# Patient Record
Sex: FEMALE | Race: WHITE | HISPANIC OR LATINO | Employment: FULL TIME | URBAN - METROPOLITAN AREA
[De-identification: names, ages, dates, MRNs, and addresses within clinical notes are randomized per-mention and may not be internally consistent; named-entity substitution may affect disease eponyms.]

---

## 2022-04-25 ENCOUNTER — OFFICE VISIT (OUTPATIENT)
Dept: OBSTETRICS AND GYNECOLOGY | Age: 37
End: 2022-04-25

## 2022-04-25 VITALS
HEIGHT: 60 IN | DIASTOLIC BLOOD PRESSURE: 76 MMHG | WEIGHT: 158.8 LBS | BODY MASS INDEX: 31.18 KG/M2 | SYSTOLIC BLOOD PRESSURE: 110 MMHG

## 2022-04-25 DIAGNOSIS — B37.31 YEAST VAGINITIS: ICD-10-CM

## 2022-04-25 DIAGNOSIS — Z01.419 WELL WOMAN EXAM WITH ROUTINE GYNECOLOGICAL EXAM: Primary | ICD-10-CM

## 2022-04-25 PROCEDURE — 99213 OFFICE O/P EST LOW 20 MIN: CPT | Performed by: NURSE PRACTITIONER

## 2022-04-25 PROCEDURE — 3008F BODY MASS INDEX DOCD: CPT | Performed by: NURSE PRACTITIONER

## 2022-04-25 PROCEDURE — 99385 PREV VISIT NEW AGE 18-39: CPT | Performed by: NURSE PRACTITIONER

## 2022-04-25 RX ORDER — FLUCONAZOLE 150 MG/1
150 TABLET ORAL ONCE
Qty: 1 TABLET | Refills: 0 | Status: SHIPPED | OUTPATIENT
Start: 2022-04-25 | End: 2022-04-25

## 2022-04-25 NOTE — PROGRESS NOTES
[facility]  Routine Annual Visit    2022    Patient: Dayna Garces          MR#:[id number]      History of Present Illness    36 y.o. female  who presents for annual exam as a new patient with several complaints today.    She reports last GYN care/pap smear 7 years ago.    She states periods are normal and monthly- no problems. She reports for the past month she has been experiencing intermittent mild lower abdominal pain- most noticeable when urinating. She had a negative urine culture earlier this month and reports completed a round of macrobid with no improvement in symptoms. No worsening or improvement in symptoms since first noticed. She also reports mild vulvar discomfort at the introitus with itching. No lesions, sores, or abnormal discharge. She reports she is not currently sexually active. CT/NG negative at urgent care.     US is completed today and shows 2 cm x 2cm pocket of endometrial fluid and a 2 cm x 2 cm complex right ovarian cyst. These results were discussed with patient and she was advised of need for follow up.    An interpretor was used throughout this visit.      Patient's last menstrual period was 2022 (approximate).  Obstetric History:  OB History        3    Para   3    Term   3            AB        Living   3       SAB        IAB        Ectopic        Molar        Multiple        Live Births   3               Menstrual History:     Patient's last menstrual period was 2022 (approximate).       Sexual History:       ________________________________________  There is no problem list on file for this patient.      History reviewed. No pertinent past medical history.    Past Surgical History:   Procedure Laterality Date   • APPENDECTOMY         Social History     Tobacco Use   Smoking Status Never Smoker   Smokeless Tobacco Never Used       Family History   Problem Relation Age of Onset   • No Known Problems Mother    • No Known Problems  "Father        Prior to Admission medications    Not on File     The following portions of the patient's history were reviewed and updated as appropriate: allergies, current medications, past family history, past medical history, past social history, past surgical history and problem list.    Review of Systems   Constitutional: Negative.    HENT: Negative.    Eyes: Negative for visual disturbance.   Respiratory: Negative for cough, shortness of breath and wheezing.    Cardiovascular: Negative for chest pain, palpitations and leg swelling.   Gastrointestinal: Negative for abdominal distention, abdominal pain, blood in stool, constipation, diarrhea, nausea and vomiting.   Endocrine: Negative for cold intolerance and heat intolerance.   Genitourinary: Positive for dysuria and pelvic pain. Negative for difficulty urinating, dyspareunia, frequency, genital sores, hematuria, menstrual problem, urgency, vaginal bleeding, vaginal discharge and vaginal pain.   Musculoskeletal: Negative.    Skin: Negative.    Neurological: Negative for dizziness, weakness, light-headedness, numbness and headaches.   Hematological: Negative.    Psychiatric/Behavioral: Negative.    Breasts: negative for lumps skin changes, dimpling, swelling, nipple changes/discharge bilaterally     Objective   Physical Exam    /76   Ht 152.4 cm (60\")   Wt 72 kg (158 lb 12.8 oz)   LMP 04/18/2022 (Approximate)   Breastfeeding No   BMI 31.01 kg/m²    BP Readings from Last 3 Encounters:   04/25/22 110/76   04/05/22 119/76   03/22/22 118/78      Wt Readings from Last 3 Encounters:   04/25/22 72 kg (158 lb 12.8 oz)   04/05/22 70.3 kg (155 lb)   03/22/22 70.3 kg (155 lb)        BMI: Estimated body mass index is 31.01 kg/m² as calculated from the following:    Height as of this encounter: 152.4 cm (60\").    Weight as of this encounter: 72 kg (158 lb 12.8 oz).            General:   alert, appears stated age and cooperative   Heart: regular rate and rhythm, " S1, S2 normal, no murmur, click, rub or gallop   Lungs: clear to auscultation bilaterally   Abdomen: soft, non-tender, without masses or organomegaly   Breast: inspection negative, no nipple discharge or bleeding, no masses or nodularity palpable   Vulva: External genitalia including bartholin's glands, Urethra, [place]'s gland and urethra meatus are normal, Perineum, rectum and anus appear normal  and Bladder appears normal without significant prolapse    Vagina: normal mucosa, normal discharge   Cervix: no cervical motion tenderness and no lesions   Uterus: normal size, mobile, non-tender and normal shape and consistency   Adnexa: no mass, fullness, tenderness     Assessment:    Diagnoses and all orders for this visit:    1. Well woman exam with routine gynecological exam (Primary)  -     IgP, Aptima HPV    2. Yeast vaginitis  -     Urine Culture - Urine, Urine, Clean Catch    Other orders  -     fluconazole (Diflucan) 150 MG tablet; Take 1 tablet by mouth 1 (One) Time for 1 dose.  Dispense: 1 tablet; Refill: 0      Recommend treat with diflucan for possible yeast. Will send repeat urine culture. Advise follow up 4 weeks for repeat US and consult. Advised to call if symptoms worsen before then. She verbalizes understanding and agrees with plan.    Healthy lifestyle modifications discussed, counseled on self breast exams and bone health        Jeanne Shoemaker, DESTINY  4/25/2022 11:15 EDT

## 2022-04-27 LAB
BACTERIA UR CULT: NO GROWTH
BACTERIA UR CULT: NORMAL

## 2022-04-29 LAB
CYTOLOGIST CVX/VAG CYTO: NORMAL
CYTOLOGY CVX/VAG DOC CYTO: NORMAL
CYTOLOGY CVX/VAG DOC THIN PREP: NORMAL
DX ICD CODE: NORMAL
HIV 1 & 2 AB SER-IMP: NORMAL
HPV I/H RISK 4 DNA CVX QL PROBE+SIG AMP: NEGATIVE
OTHER STN SPEC: NORMAL
STAT OF ADQ CVX/VAG CYTO-IMP: NORMAL

## 2022-05-27 ENCOUNTER — OFFICE VISIT (OUTPATIENT)
Dept: OBSTETRICS AND GYNECOLOGY | Age: 37
End: 2022-05-27

## 2022-05-27 VITALS
WEIGHT: 156.6 LBS | SYSTOLIC BLOOD PRESSURE: 114 MMHG | HEIGHT: 60 IN | BODY MASS INDEX: 30.74 KG/M2 | DIASTOLIC BLOOD PRESSURE: 70 MMHG

## 2022-05-27 DIAGNOSIS — N83.201 CYST OF RIGHT OVARY: ICD-10-CM

## 2022-05-27 DIAGNOSIS — R30.0 DYSURIA: Primary | ICD-10-CM

## 2022-05-27 DIAGNOSIS — N89.8 VAGINAL ITCHING: ICD-10-CM

## 2022-05-27 PROCEDURE — 99213 OFFICE O/P EST LOW 20 MIN: CPT | Performed by: STUDENT IN AN ORGANIZED HEALTH CARE EDUCATION/TRAINING PROGRAM

## 2022-05-27 RX ORDER — PHENAZOPYRIDINE HYDROCHLORIDE 95 MG/1
95 TABLET ORAL 3 TIMES DAILY
Qty: 9 TABLET | Refills: 0 | Status: SHIPPED | OUTPATIENT
Start: 2022-05-27 | End: 2022-05-30

## 2022-05-27 RX ORDER — FLUCONAZOLE 150 MG/1
TABLET ORAL
Qty: 2 TABLET | Refills: 0 | Status: SHIPPED | OUTPATIENT
Start: 2022-05-27

## 2022-05-27 RX ORDER — FLUCONAZOLE 150 MG/1
150 TABLET ORAL ONCE
COMMUNITY
Start: 2022-04-25 | End: 2022-05-27

## 2022-05-27 NOTE — PROGRESS NOTES
[facility]   Obstetrics and Gynecology     2022      Patient:  Dayna Garces   MR#:[id number]    Office note    Chief Complaint   Patient presents with   • Follow-up     Gyn follow up US today c/o lower abdominal pain       Subjective     History of Present Illness  36 y.o. female  present for f/u of right ovarian cyst.  Identified on Us one month ago and appeared complex.  Appears simple today and <2cm.  Reports intermittent midline pelvic cramping since last visit.  Reports regular monthly menses and the cramps happen at times when she is not menstruating.  Also reports burning at urethra after urinating but not during urination x 6 mo.  Feels like these symptoms are totally separate.  Had a yeast infection 6 weeks ago that resolved with one dose of diflucan.  Denies itching currently.      #[id number]      Relevant data reviewed:  US Non-ob Transvaginal (2022 10:45)  US Non-ob Transvaginal (2022 10:56)      There is no problem list on file for this patient.      Past Medical History:   Diagnosis Date   • No pertinent past medical history      Past Surgical History:   Procedure Laterality Date   • APPENDECTOMY     • TUBAL ABDOMINAL LIGATION       Obstetric History:  OB History        3    Para   3    Term   3            AB        Living   3       SAB        IAB        Ectopic        Molar        Multiple        Live Births   3               Menstrual History:     Patient's last menstrual period was 2022 (approximate).       # 1 - Date: , Sex: Female, Weight: 2722 g (6 lb), GA: None, Delivery: Vaginal, Spontaneous, Apgar1: None, Apgar5: None, Living: Living, Birth Comments: None    # 2 - Date: , Sex: Female, Weight: 4082 g (9 lb), GA: None, Delivery: Vaginal, Spontaneous, Apgar1: None, Apgar5: None, Living: Living, Birth Comments: None    # 3 - Date: , Sex: Male, Weight: 4082 g (9 lb), GA: None, Delivery: Vaginal, Spontaneous,  "Apgar1: None, Apgar5: None, Living: Living, Birth Comments: None    Family History   Problem Relation Age of Onset   • No Known Problems Father    • No Known Problems Mother    • Breast cancer Neg Hx    • Uterine cancer Neg Hx    • Colon cancer Neg Hx    • Ovarian cancer Neg Hx      Social History     Tobacco Use   • Smoking status: Never Smoker   • Smokeless tobacco: Never Used   Vaping Use   • Vaping Use: Never used   Substance Use Topics   • Alcohol use: No   • Drug use: Never     Patient has no known allergies.    Current Outpatient Medications:   •  fluconazole (Diflucan) 150 MG tablet, Take one tablet once then a second tablet 3 days later, Disp: 2 tablet, Rfl: 0  •  phenazopyridine (PYRIDIUM) 95 MG tablet, Take 1 tablet by mouth 3 (Three) Times a Day for 3 days., Disp: 9 tablet, Rfl: 0    The following portions of the patient's history were reviewed and updated as appropriate: allergies, current medications, past family history, past medical history, past social history, past surgical history and problem list.    Review of Systems   Genitourinary: Positive for dysuria and vaginal pain.   All other systems reviewed and are negative.      BP Readings from Last 3 Encounters:   05/27/22 114/70   04/25/22 110/76   04/05/22 119/76      Wt Readings from Last 3 Encounters:   05/27/22 71 kg (156 lb 9.6 oz)   04/25/22 72 kg (158 lb 12.8 oz)   04/05/22 70.3 kg (155 lb)      BMI: Estimated body mass index is 30.58 kg/m² as calculated from the following:    Height as of this encounter: 152.4 cm (60\").    Weight as of this encounter: 71 kg (156 lb 9.6 oz). BSA: Estimated body surface area is 1.68 meters squared as calculated from the following:    Height as of this encounter: 152.4 cm (60\").    Weight as of this encounter: 71 kg (156 lb 9.6 oz).    Objective   Physical Exam  Vitals and nursing note reviewed.   Constitutional:       General: She is not in acute distress.     Appearance: Normal appearance.   Pulmonary:      " Effort: Pulmonary effort is normal. No respiratory distress.   Genitourinary:     General: Normal vulva.      Vagina: Vaginal discharge (thick, white discharge) present.      Cervix: Normal.   Neurological:      Mental Status: She is alert.         Assessment & Plan     Diagnoses and all orders for this visit:    1. Dysuria (Primary)    2. Cyst of right ovary    3. Vaginal itching  -     NuSwab Vaginitis (VG) - Swab, Vagina    Other orders  -     phenazopyridine (PYRIDIUM) 95 MG tablet; Take 1 tablet by mouth 3 (Three) Times a Day for 3 days.  Dispense: 9 tablet; Refill: 0  -     fluconazole (Diflucan) 150 MG tablet; Take one tablet once then a second tablet 3 days later  Dispense: 2 tablet; Refill: 0    -Reviewed ultrasound imaging.  Right ovarian cyst is under 2 cm and appears simple on today's ultrasound.  It appeared complex with internal projections on her last ultrasound so I would like to repeat imaging in 6 months to confirm normalcy.  - Exam consistent with yeast infection so will empirically treat with Diflucan every 3 days x2 doses.  I suspect this may be contributing to the burning after urination and irritation with intercourse as well.  I also recommended patient try Pyridium for 3 days.  If this improves her symptoms, I encouraged her to try cranberry juice if the symptoms recur.    Return in about 6 months (around 11/27/2022) for Next f/u with gyn Us of right ovarian cyst.    I spent 20 minutes caring for Marlee on this date of service. This time includes time spent by me in the following activities: preparing for the visit, reviewing tests, obtaining and/or reviewing a separately obtained history, performing a medically appropriate examination and/or evaluation, counseling and educating the patient/family/caregiver, ordering medications, tests, or procedures and documenting information in the medical record.    Shelli Reyes MD   5/27/2022 12:40 EDT

## 2022-05-30 LAB
A VAGINAE DNA VAG QL NAA+PROBE: NORMAL SCORE
BVAB2 DNA VAG QL NAA+PROBE: NORMAL SCORE
C ALBICANS DNA VAG QL NAA+PROBE: NEGATIVE
C GLABRATA DNA VAG QL NAA+PROBE: NEGATIVE
MEGA1 DNA VAG QL NAA+PROBE: NORMAL SCORE
T VAGINALIS DNA VAG QL NAA+PROBE: NEGATIVE

## 2022-07-14 ENCOUNTER — TELEPHONE (OUTPATIENT)
Dept: OBSTETRICS AND GYNECOLOGY | Age: 37
End: 2022-07-14

## 2022-07-14 NOTE — TELEPHONE ENCOUNTER
Patient is wanting to switch her care to .  She states the reason she is wanting to switch is due to the language barrier and she knows  speaks Grenadian.  Please advise.

## 2022-09-07 ENCOUNTER — OFFICE VISIT (OUTPATIENT)
Dept: OBSTETRICS AND GYNECOLOGY | Age: 37
End: 2022-09-07

## 2022-09-07 ENCOUNTER — TELEPHONE (OUTPATIENT)
Dept: OBSTETRICS AND GYNECOLOGY | Age: 37
End: 2022-09-07

## 2022-09-07 VITALS
SYSTOLIC BLOOD PRESSURE: 118 MMHG | BODY MASS INDEX: 30.43 KG/M2 | HEIGHT: 60 IN | WEIGHT: 155 LBS | DIASTOLIC BLOOD PRESSURE: 70 MMHG

## 2022-09-07 DIAGNOSIS — N30.11 INTERSTITIAL CYSTITIS (CHRONIC) WITH HEMATURIA: ICD-10-CM

## 2022-09-07 DIAGNOSIS — N83.201 CYST OF RIGHT OVARY: ICD-10-CM

## 2022-09-07 DIAGNOSIS — Z13.89 SCREENING FOR BLOOD OR PROTEIN IN URINE: ICD-10-CM

## 2022-09-07 DIAGNOSIS — N94.10 DYSPAREUNIA IN FEMALE: Primary | ICD-10-CM

## 2022-09-07 DIAGNOSIS — N89.8 VAGINAL DISCHARGE: ICD-10-CM

## 2022-09-07 LAB
BILIRUB BLD-MCNC: NEGATIVE MG/DL
CLARITY, POC: CLEAR
COLOR UR: YELLOW
GLUCOSE UR STRIP-MCNC: NEGATIVE MG/DL
KETONES UR QL: NEGATIVE
LEUKOCYTE EST, POC: ABNORMAL
NITRITE UR-MCNC: NEGATIVE MG/ML
PH UR: 5.5 [PH] (ref 5–8)
PROT UR STRIP-MCNC: NEGATIVE MG/DL
RBC # UR STRIP: ABNORMAL /UL
SP GR UR: 1 (ref 1–1.03)
UROBILINOGEN UR QL: NORMAL

## 2022-09-07 PROCEDURE — 99213 OFFICE O/P EST LOW 20 MIN: CPT | Performed by: OBSTETRICS & GYNECOLOGY

## 2022-09-07 NOTE — PROGRESS NOTES
[facility]   Obstetrics and Gynecology     2022      Patient:  Dayna Garces   MR#:[id number]    Office note    Chief Complaint   Patient presents with   • Follow-up     gyn f/u ovarian cyst- former [facility] pt (switched to due language barrier)        Subjective     History of Present Illness  36 y.o. female  presents for follow-up on her previous complex ovarian cyst that appears resolved on repeat ultrasound today.  The patient states enlarged most of her symptoms have resolved as well.  The patient reported a vaginal discharge last visit to that has not resolved after treatment.    The patient reports she continues to have some mild discomfort on the outside with intercourse.    And exploring the issue more in depth with the patient she is actually having extreme discomfort after voiding feeling like she still needs to void for several minutes after her bladder is emptied.        Relevant data reviewed:  NuSwab Vaginitis (VG) - Swab, Vagina (2022 15:37)  US Non-ob Transvaginal (2022 13:53)      There is no problem list on file for this patient.      Past Medical History:   Diagnosis Date   • No pertinent past medical history      Past Surgical History:   Procedure Laterality Date   • APPENDECTOMY     • TUBAL ABDOMINAL LIGATION       Obstetric History:  OB History        3    Para   3    Term   3            AB        Living   3       SAB        IAB        Ectopic        Molar        Multiple        Live Births   3               Menstrual History:     Patient's last menstrual period was 2022 (exact date).       # 1 - Date: , Sex: Female, Weight: 2722 g (6 lb), GA: None, Delivery: Vaginal, Spontaneous, Apgar1: None, Apgar5: None, Living: Living, Birth Comments: None    # 2 - Date: , Sex: Female, Weight: 4082 g (9 lb), GA: None, Delivery: Vaginal, Spontaneous, Apgar1: None, Apgar5: None, Living: Living, Birth Comments: None    # 3 - Date: , Sex: Male,  "Weight: 4082 g (9 lb), GA: None, Delivery: Vaginal, Spontaneous, Apgar1: None, Apgar5: None, Living: Living, Birth Comments: None    Family History   Problem Relation Age of Onset   • No Known Problems Father    • No Known Problems Mother    • Breast cancer Neg Hx    • Uterine cancer Neg Hx    • Colon cancer Neg Hx    • Ovarian cancer Neg Hx      Social History     Tobacco Use   • Smoking status: Never Smoker   • Smokeless tobacco: Never Used   Vaping Use   • Vaping Use: Never used   Substance Use Topics   • Alcohol use: No   • Drug use: Never     Patient has no known allergies.    Current Outpatient Medications:   •  fluconazole (Diflucan) 150 MG tablet, Take one tablet once then a second tablet 3 days later, Disp: 2 tablet, Rfl: 0    The following portions of the patient's history were reviewed and updated as appropriate: allergies, current medications, past family history, past medical history, past social history, past surgical history and problem list.    Review of Systems    BP Readings from Last 3 Encounters:   09/07/22 118/70   05/27/22 114/70   04/25/22 110/76      Wt Readings from Last 3 Encounters:   09/07/22 70.3 kg (155 lb)   05/27/22 71 kg (156 lb 9.6 oz)   04/25/22 72 kg (158 lb 12.8 oz)      BMI: Estimated body mass index is 30.27 kg/m² as calculated from the following:    Height as of this encounter: 152.4 cm (60\").    Weight as of this encounter: 70.3 kg (155 lb). BSA: Estimated body surface area is 1.67 meters squared as calculated from the following:    Height as of this encounter: 152.4 cm (60\").    Weight as of this encounter: 70.3 kg (155 lb).    Objective   Physical Exam  Vitals and nursing note reviewed.   Constitutional:       General: She is not in acute distress.     Appearance: Normal appearance. She is well-developed. She is not ill-appearing.   HENT:      Head: Normocephalic.   Pulmonary:      Effort: Pulmonary effort is normal.   Abdominal:      General: Abdomen is flat. There is no " distension.      Palpations: Abdomen is soft. There is no mass.      Tenderness: There is no abdominal tenderness.   Genitourinary:     Vagina: Normal.      Comments: Normal external anatomy with no lesions    Normal vaginal secretions with slight blood-tinged from residual menses  Musculoskeletal:         General: No swelling or tenderness.      Comments: No calf tenderness or swelling    Neurological:      Mental Status: She is alert and oriented to person, place, and time.   Psychiatric:         Behavior: Behavior normal.         Thought Content: Thought content normal.         Judgment: Judgment normal.         Assessment & Plan     Diagnoses and all orders for this visit:    1. Dyspareunia in female (Primary)    2. Cyst of right ovary    3. Vaginal discharge      Plan:  Urine sent for culture.  Information on interstitial cystitis provided in Greek.  Inquiry made for Elmiron treatment for cystitis but the medication is more than $900 a month.    Patient may need to see urology for cystoscopy    No follow-ups on file.    Tai Brown MD   9/7/2022 14:25 EDT

## 2022-09-07 NOTE — TELEPHONE ENCOUNTER
I called [facility] for a price of Elmiron 100 tid for a month and the cost is $900.00 per month.

## 2022-09-09 LAB
BACTERIA UR CULT: NO GROWTH
BACTERIA UR CULT: NORMAL

## 2022-09-11 PROBLEM — A59.01 TRICHOMONAL VAGINITIS: Status: ACTIVE | Noted: 2022-09-11

## 2022-09-11 LAB
A VAGINAE DNA VAG QL NAA+PROBE: ABNORMAL SCORE
BVAB2 DNA VAG QL NAA+PROBE: ABNORMAL SCORE
C ALBICANS DNA VAG QL NAA+PROBE: NEGATIVE
C GLABRATA DNA VAG QL NAA+PROBE: NEGATIVE
C TRACH DNA VAG QL NAA+PROBE: NEGATIVE
MEGA1 DNA VAG QL NAA+PROBE: ABNORMAL SCORE
N GONORRHOEA DNA VAG QL NAA+PROBE: NEGATIVE
T VAGINALIS DNA VAG QL NAA+PROBE: POSITIVE

## 2022-09-11 NOTE — PROGRESS NOTES
Call patient: STD screening returns positive for Trichomonas    Scheduled consult for treatment and instructions

## 2022-09-14 ENCOUNTER — OFFICE VISIT (OUTPATIENT)
Dept: OBSTETRICS AND GYNECOLOGY | Age: 37
End: 2022-09-14

## 2022-09-14 VITALS
HEIGHT: 60 IN | SYSTOLIC BLOOD PRESSURE: 118 MMHG | DIASTOLIC BLOOD PRESSURE: 70 MMHG | BODY MASS INDEX: 30.23 KG/M2 | WEIGHT: 154 LBS

## 2022-09-14 DIAGNOSIS — Z13.89 SCREENING FOR BLOOD OR PROTEIN IN URINE: ICD-10-CM

## 2022-09-14 DIAGNOSIS — A59.01 TRICHOMONAL VAGINITIS: Primary | ICD-10-CM

## 2022-09-14 LAB
BILIRUB BLD-MCNC: NEGATIVE MG/DL
GLUCOSE UR STRIP-MCNC: NEGATIVE MG/DL
KETONES UR QL: NEGATIVE
LEUKOCYTE EST, POC: NEGATIVE
NITRITE UR-MCNC: NEGATIVE MG/ML
PH UR: 7 [PH] (ref 5–8)
PROT UR STRIP-MCNC: NEGATIVE MG/DL
RBC # UR STRIP: NEGATIVE /UL
SP GR UR: 1.01 (ref 1–1.03)
UROBILINOGEN UR QL: NORMAL

## 2022-09-14 PROCEDURE — 81002 URINALYSIS NONAUTO W/O SCOPE: CPT | Performed by: OBSTETRICS & GYNECOLOGY

## 2022-09-14 PROCEDURE — 99213 OFFICE O/P EST LOW 20 MIN: CPT | Performed by: OBSTETRICS & GYNECOLOGY

## 2022-09-14 NOTE — PROGRESS NOTES
[facility]   Obstetrics and Gynecology     2022      Patient:  Dayna Garces   MR#:[id number]    Office note    Chief Complaint   Patient presents with   • Consult     +trich consult        Subjective     History of Present Illness  36 y.o. female  presents for follow-up discussion on positive trichomonas screen last visit.  The patient's been reporting symptoms of bladder irritation and vaginitis for months and months.        Relevant data reviewed:      Patient Active Problem List   Diagnosis   • Trichomonal vaginitis       Past Medical History:   Diagnosis Date   • No pertinent past medical history      Past Surgical History:   Procedure Laterality Date   • APPENDECTOMY     • TUBAL ABDOMINAL LIGATION       Obstetric History:  OB History        3    Para   3    Term   3            AB        Living   3       SAB        IAB        Ectopic        Molar        Multiple        Live Births   3               Menstrual History:     Patient's last menstrual period was 2022 (exact date).       # 1 - Date: , Sex: Female, Weight: 2722 g (6 lb), GA: None, Delivery: Vaginal, Spontaneous, Apgar1: None, Apgar5: None, Living: Living, Birth Comments: None    # 2 - Date: , Sex: Female, Weight: 4082 g (9 lb), GA: None, Delivery: Vaginal, Spontaneous, Apgar1: None, Apgar5: None, Living: Living, Birth Comments: None    # 3 - Date: , Sex: Male, Weight: 4082 g (9 lb), GA: None, Delivery: Vaginal, Spontaneous, Apgar1: None, Apgar5: None, Living: Living, Birth Comments: None    Family History   Problem Relation Age of Onset   • No Known Problems Father    • No Known Problems Mother    • Breast cancer Neg Hx    • Uterine cancer Neg Hx    • Colon cancer Neg Hx    • Ovarian cancer Neg Hx      Social History     Tobacco Use   • Smoking status: Never Smoker   • Smokeless tobacco: Never Used   Vaping Use   • Vaping Use: Never used   Substance Use Topics   • Alcohol use: No   • Drug use:  "Never     Patient has no known allergies.    Current Outpatient Medications:   •  fluconazole (Diflucan) 150 MG tablet, Take one tablet once then a second tablet 3 days later, Disp: 2 tablet, Rfl: 0    The following portions of the patient's history were reviewed and updated as appropriate: allergies, current medications, past family history, past medical history, past social history, past surgical history and problem list.    Review of Systems   Genitourinary: Positive for vaginal discharge.       BP Readings from Last 3 Encounters:   09/14/22 118/70   09/07/22 118/70   05/27/22 114/70      Wt Readings from Last 3 Encounters:   09/14/22 69.9 kg (154 lb)   09/07/22 70.3 kg (155 lb)   05/27/22 71 kg (156 lb 9.6 oz)      BMI: Estimated body mass index is 30.08 kg/m² as calculated from the following:    Height as of this encounter: 152.4 cm (60\").    Weight as of this encounter: 69.9 kg (154 lb). BSA: Estimated body surface area is 1.67 meters squared as calculated from the following:    Height as of this encounter: 152.4 cm (60\").    Weight as of this encounter: 69.9 kg (154 lb).    Objective   Physical Exam  Vitals and nursing note reviewed.   Constitutional:       Appearance: She is well-developed.   HENT:      Head: Normocephalic and atraumatic.   Cardiovascular:      Rate and Rhythm: Normal rate.   Pulmonary:      Effort: Pulmonary effort is normal.   Abdominal:      General: Bowel sounds are normal. There is no distension.      Palpations: Abdomen is soft.      Tenderness: There is no abdominal tenderness.   Skin:     General: Skin is warm and dry.   Neurological:      Mental Status: She is alert and oriented to person, place, and time.   Psychiatric:         Behavior: Behavior normal.         Thought Content: Thought content normal.         Judgment: Judgment normal.         Assessment & Plan     Diagnoses and all orders for this visit:    1. Trichomonal vaginitis (Primary)    2. Screening for blood or protein " "in urine  -     POC Urinalysis Dipstick      Secnidazole 2g packets x 4 provided.  Patient is instructed to take the medicine with her  this evening and then repeat the dose in 1 week.    Patient instructed not to consume any alcohol products before or after the medication.    Suggest the patient return for test of cure in 6 weeks.    Discussed the issue of the positive STD in a \"monogamous couple\" and the need to address that point to avoid reinfection     No follow-ups on file.    Tai Brown MD   9/14/2022 15:35 EDT  "

## 2022-11-02 ENCOUNTER — OFFICE VISIT (OUTPATIENT)
Dept: OBSTETRICS AND GYNECOLOGY | Age: 37
End: 2022-11-02

## 2022-11-02 VITALS
HEIGHT: 60 IN | DIASTOLIC BLOOD PRESSURE: 70 MMHG | SYSTOLIC BLOOD PRESSURE: 118 MMHG | WEIGHT: 156 LBS | BODY MASS INDEX: 30.63 KG/M2

## 2022-11-02 DIAGNOSIS — R39.15 URINARY URGENCY: ICD-10-CM

## 2022-11-02 DIAGNOSIS — Z13.89 SCREENING FOR BLOOD OR PROTEIN IN URINE: ICD-10-CM

## 2022-11-02 DIAGNOSIS — A59.01 TRICHOMONAL VAGINITIS: Primary | ICD-10-CM

## 2022-11-02 LAB
BILIRUB BLD-MCNC: NEGATIVE MG/DL
GLUCOSE UR STRIP-MCNC: NEGATIVE MG/DL
KETONES UR QL: NEGATIVE
LEUKOCYTE EST, POC: NEGATIVE
NITRITE UR-MCNC: NEGATIVE MG/ML
PH UR: 5 [PH] (ref 5–8)
PROT UR STRIP-MCNC: NEGATIVE MG/DL
RBC # UR STRIP: NEGATIVE /UL
SP GR UR: 1.02 (ref 1–1.03)
UROBILINOGEN UR QL: NORMAL

## 2022-11-02 PROCEDURE — 99213 OFFICE O/P EST LOW 20 MIN: CPT | Performed by: OBSTETRICS & GYNECOLOGY

## 2022-11-02 RX ORDER — SOLIFENACIN SUCCINATE 10 MG/1
10 TABLET, FILM COATED ORAL DAILY
Qty: 30 TABLET | Refills: 9 | Status: SHIPPED | OUTPATIENT
Start: 2022-11-02 | End: 2023-11-02

## 2022-11-02 NOTE — PROGRESS NOTES
[facility]   Obstetrics and Gynecology     2022      Patient:  Dayna Garces   MR#:[id number]    Office note    Chief Complaint   Patient presents with   • Follow-up     VICKIE trich        Subjective     History of Present Illness  36 y.o. female  presents for scheduled test of cure for previous trichomonas infection.  She reports taking her medication.  The patient was seen previously complaining of severe urinary urgency and recurrent urgency after she voids.  Urine screens previously and today are negative.    Previously her symptoms sounded like interstitial cystitis and a trial of medication was sent to her pharmacy but was cost prohibitive as the patient does not have insurance coverage.    We discussed the possibility of trying medicines for detrusor instability but the same situation applies where the medication is cost prohibitory.      Relevant data reviewed:      Patient Active Problem List   Diagnosis   • Trichomonal vaginitis       Past Medical History:   Diagnosis Date   • No pertinent past medical history      Past Surgical History:   Procedure Laterality Date   • APPENDECTOMY     • TUBAL ABDOMINAL LIGATION       Obstetric History:  OB History        3    Para   3    Term   3            AB        Living   3       SAB        IAB        Ectopic        Molar        Multiple        Live Births   3               Menstrual History:     Patient's last menstrual period was 10/15/2022 (within days).       # 1 - Date: , Sex: Female, Weight: 2722 g (6 lb), GA: None, Delivery: Vaginal, Spontaneous, Apgar1: None, Apgar5: None, Living: Living, Birth Comments: None    # 2 - Date: , Sex: Female, Weight: 4082 g (9 lb), GA: None, Delivery: Vaginal, Spontaneous, Apgar1: None, Apgar5: None, Living: Living, Birth Comments: None    # 3 - Date: , Sex: Male, Weight: 4082 g (9 lb), GA: None, Delivery: Vaginal, Spontaneous, Apgar1: None, Apgar5: None, Living: Living, Birth  "Comments: None    Family History   Problem Relation Age of Onset   • No Known Problems Father    • No Known Problems Mother    • Breast cancer Neg Hx    • Uterine cancer Neg Hx    • Colon cancer Neg Hx    • Ovarian cancer Neg Hx      Social History     Tobacco Use   • Smoking status: Never   • Smokeless tobacco: Never   Vaping Use   • Vaping Use: Never used   Substance Use Topics   • Alcohol use: No   • Drug use: Never     Patient has no known allergies.    Current Outpatient Medications:   •  fluconazole (Diflucan) 150 MG tablet, Take one tablet once then a second tablet 3 days later, Disp: 2 tablet, Rfl: 0  •  solifenacin (VESIcare) 10 MG tablet, Take 1 tablet by mouth Daily., Disp: 30 tablet, Rfl: 9    The following portions of the patient's history were reviewed and updated as appropriate: allergies, current medications, past family history, past medical history, past social history, past surgical history and problem list.    Review of Systems    BP Readings from Last 3 Encounters:   11/02/22 118/70   09/14/22 118/70   09/07/22 118/70      Wt Readings from Last 3 Encounters:   11/02/22 70.8 kg (156 lb)   09/14/22 69.9 kg (154 lb)   09/07/22 70.3 kg (155 lb)      BMI: Estimated body mass index is 30.47 kg/m² as calculated from the following:    Height as of this encounter: 152.4 cm (60\").    Weight as of this encounter: 70.8 kg (156 lb). BSA: Estimated body surface area is 1.68 meters squared as calculated from the following:    Height as of this encounter: 152.4 cm (60\").    Weight as of this encounter: 70.8 kg (156 lb).    Objective   Physical Exam  Vitals and nursing note reviewed.   Constitutional:       Appearance: She is well-developed.   HENT:      Head: Normocephalic and atraumatic.   Cardiovascular:      Rate and Rhythm: Normal rate.   Pulmonary:      Effort: Pulmonary effort is normal.   Abdominal:      General: Bowel sounds are normal. There is no distension.      Palpations: Abdomen is soft.      " Tenderness: There is no abdominal tenderness.   Skin:     General: Skin is warm and dry.   Neurological:      Mental Status: She is alert and oriented to person, place, and time.   Psychiatric:         Behavior: Behavior normal.         Thought Content: Thought content normal.         Judgment: Judgment normal.         Assessment & Plan     Diagnoses and all orders for this visit:    1. Trichomonal vaginitis (Primary)  -     Chlamydia trachomatis, Neisseria gonorrhoeae, Trichomonas vaginalis, PCR - Swab, Cervix    2. Screening for blood or protein in urine  -     POC Urinalysis Dipstick    3. Urinary urgency  -     solifenacin (VESIcare) 10 MG tablet; Take 1 tablet by mouth Daily.  Dispense: 30 tablet; Refill: 9          No follow-ups on file.    Tai Brown MD   11/2/2022 15:06 EDT

## 2022-11-04 LAB
C TRACH RRNA SPEC QL NAA+PROBE: NEGATIVE
N GONORRHOEA RRNA SPEC QL NAA+PROBE: NEGATIVE
T VAGINALIS RRNA SPEC QL NAA+PROBE: NEGATIVE